# Patient Record
Sex: MALE | Race: WHITE
[De-identification: names, ages, dates, MRNs, and addresses within clinical notes are randomized per-mention and may not be internally consistent; named-entity substitution may affect disease eponyms.]

---

## 2018-05-12 ENCOUNTER — HOSPITAL ENCOUNTER (EMERGENCY)
Dept: HOSPITAL 74 - JER | Age: 28
Discharge: HOME | End: 2018-05-12
Payer: SELF-PAY

## 2018-05-12 VITALS — BODY MASS INDEX: 31.8 KG/M2

## 2018-05-12 VITALS — SYSTOLIC BLOOD PRESSURE: 139 MMHG | HEART RATE: 55 BPM | TEMPERATURE: 98 F | DIASTOLIC BLOOD PRESSURE: 74 MMHG

## 2018-05-12 DIAGNOSIS — J20.9: Primary | ICD-10-CM

## 2018-05-12 NOTE — PDOC
History of Present Illness





- General


Chief Complaint: Cold Symptoms


Stated Complaint: COLD SYMPTOMS


Time Seen by Provider: 05/12/18 22:14


History Source: Patient


Exam Limitations: No Limitations





- History of Present Illness


Initial Comments: 





05/12/18 23:11


Best Contact: [phone]


PCP:Dr. MELISA Garcia





Pmhx:N/A


Pshx:N/A


Allergies:NKDA





27-year-old male presents to the emergency department complaining of subjective 

fever, productive cough 4 days without headache, dizziness, lightheadedness, 

nausea/vomiting, chills, facial pain, nasal congestion, rhinorrhea, earaches, 

sore throat, neck pain/stiffness, back pains, chest pain, shortness of breath, 

abdominal pains, flank pains, urinary symptoms, extremity numbness or tingling 

sensation. Patient states he's been taking Mucinex which helps bring up his 

phlegm but doesn't subsided coughing.





Past History





- Past Medical History


Allergies/Adverse Reactions: 


 Allergies











Allergy/AdvReac Type Severity Reaction Status Date / Time


 


No Known Allergies Allergy   Verified 05/12/18 22:10











Home Medications: 


Ambulatory Orders





Oxycodone HCl/Acetaminophen [Percocet 5-325 mg Tablet -] 1 - 2 tab PO Q4H #10 

tablet 11/12/14 


Penicillin V Potassium [Pen Vee K -] 500 mg PO QID #40 tablet 11/12/14 











- Suicide/Smoking/Psychosocial Hx


Smoking Status: No


Smoking History: Never smoked


Have you smoked in the past 12 months: No


Number of Cigarettes Smoked Daily: 0


Information on smoking cessation initiated: No


Hx Alcohol Use: No


Drug/Substance Use Hx: No


Substance Use Type: None





**Review of Systems





- Review of Systems


Able to Perform ROS?: Yes


Comments:: 





05/12/18 23:13


CONSTITUTIONAL: 


Absent: fever, chills, diaphoresis, generalized weakness, malaise, loss of 

appetite


HEENT: 


Absent: rhinorrhea, nasal congestion, throat pain, throat swelling, difficulty 

swallowing, mouth swelling, ear pain, eye pain, visual Changes


CARDIOVASCULAR: 


Absent: chest pain, loss of consciousness, palpitations, irregular heart rate, 

peripheral edema


RESPIRATORY: 


+cough


Absent: shortness of breath, dyspnea with exertion, orthopnea, wheezing, stridor

, hemoptysis


GASTROINTESTINAL:


Absent: abdominal pain, abdominal distension, nausea, vomiting, diarrhea, 

constipation, melena, hematochezia


GENITOURINARY: 


Absent: dysuria, frequency, urgency, hesitancy, hematuria, flank pain, genital 

pain


MUSCULOSKELETAL: 


Absent: myalgia, arthralgia, joint swelling


SKIN: 


Absent: rash, itching, pallor


HEMATOLOGIC/IMMUNOLOGIC: 


Absent: easy bleeding, easy bruising, lymphadenopathy, frequent infections





Is the patient limited English proficient: No





*Physical Exam





- Vital Signs


 Last Vital Signs











Temp Pulse Resp BP Pulse Ox


 


 98.0 F   55 L  16   139/74   98 


 


 05/12/18 22:08  05/12/18 22:08  05/12/18 22:08  05/12/18 22:08  05/12/18 22:08














- Physical Exam


Comments: 





05/12/18 23:14


GENERAL:


Well developed, well nourished. Awake and alert. No acute distress.


HEENT:


Normocephalic, atraumatic. PERRLA, EOMI. No conjunctival pallor. Sclera are non-

icteric. Moist mucous membranes. Oropharynx is clear.


NECK: 


Supple. Full ROM. No JVD. Carotid pulses 2+ and symmetric, without bruits. No 

thyromegaly. No lymphadenopathy.


CARDIOVASCULAR:


Regular rate and rhythm. No murmurs, rubs, or gallops. Distal pulses are 2+ and 

symmetric. 


PULMONARY: 


No evidence of respiratory distress. Lungs clear to auscultation bilaterally. 

No wheezing, rales or rhonchi.


ABDOMINAL:


Soft. Non-tender. Non-distended. No rebound or guarding. No organomegaly. 

Normoactive bowel sounds. 


MUSCULOSKELETAL 


Normal range of motion at all joints. No bony deformities or tenderness. No CVA 

tenderness.


EXTREMITIES: 


No cyanosis. No clubbing. No edema. No calf tenderness.


SKIN: 


Warm and dry. Normal capillary refill. No rashes. No jaundice. 


NEUROLOGICAL: 


Alert, awake, appropriate. Cranial nerves 2-12 intact. No deficits to light 

touch and temperature in face, upper extremities and lower extremities. No 

motor deficits in the in face, upper extremities and lower extremities. 

Normoreflexic in the upper and lower extremities. Normal speech. Toes are down-

going bilaterally. Gait is normal without ataxia.


PSYCHIATRIC: 


Cooperative. Good eye contact. Appropriate mood and affect.





ED Treatment Course





- RADIOLOGY


Radiology Studies Ordered: 














 Category Date Time Status


 


 CHEST PA & LAT [RAD] Stat Radiology  05/12/18 22:57 Ordered











Radiograph Interpretation: 





05/12/18 23:15


CXR: 2v NAD





*DC/Admit/Observation/Transfer


Diagnosis at time of Disposition: 


Acute bronchitis


Qualifiers:


 Bronchitis organism: unspecified organism Qualified Code(s): J20.9 - Acute 

bronchitis, unspecified








- Discharge Dispostion


Condition at time of disposition: Stable


Decision to Admit order: No





- Referrals


Referrals: 


Shahriar Demarco MD, MD [Staff Physician] - 





- Patient Instructions


Printed Discharge Instructions:  DI for Acute Bronchitis


Additional Instructions: 


Rest


Increase fluids


Over-the-counter supportive care


Follow-up with your physician or the pulmonologist listed on your discharge 

within 48 hours


Please return back to the emergency department for severe/persistent or 

worsening symptoms





- Post Discharge Activity

## 2018-05-12 NOTE — PDOC
*Physical Exam





- Vital Signs


 Last Vital Signs











Temp Pulse Resp BP Pulse Ox


 


 98.0 F   55 L  16   139/74   98 


 


 05/12/18 22:08  05/12/18 22:08  05/12/18 22:08  05/12/18 22:08  05/12/18 22:08














Medical Decision Making





- Medical Decision Making





05/12/18 23:18


Pt seen by Midlevel Provider under my direct supervision


I agree with plan as outlined by Midlevel Provider








*DC/Admit/Observation/Transfer


Diagnosis at time of Disposition: 


Acute bronchitis


Qualifiers:


 Bronchitis organism: unspecified organism Qualified Code(s): J20.9 - Acute 

bronchitis, unspecified








- Discharge Dispostion


Disposition: HOME


Condition at time of disposition: Stable





- Prescriptions


Prescriptions: 


Azithromycin [Zithromax -] 250 mg PO UTDICT #6 tab





- Referrals


Referrals: 


Shahriar Demarco MD, MD [Staff Physician] - 





- Patient Instructions


Printed Discharge Instructions:  DI for Acute Bronchitis


Additional Instructions: 


Rest


Increase fluids


Over-the-counter supportive care


Follow-up with your physician or the pulmonologist listed on your discharge 

within 48 hours


Please return back to the emergency department for severe/persistent or 

worsening symptoms





- Post Discharge Activity

## 2018-09-26 ENCOUNTER — HOSPITAL ENCOUNTER (EMERGENCY)
Dept: HOSPITAL 74 - JER | Age: 28
Discharge: HOME | End: 2018-09-26
Payer: SELF-PAY

## 2018-09-26 VITALS — BODY MASS INDEX: 28.7 KG/M2

## 2018-09-26 VITALS — TEMPERATURE: 97.4 F

## 2018-09-26 VITALS — DIASTOLIC BLOOD PRESSURE: 85 MMHG | HEART RATE: 63 BPM | SYSTOLIC BLOOD PRESSURE: 135 MMHG
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
## 2018-09-26 DIAGNOSIS — S43.014A: Primary | ICD-10-CM
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
## 2018-09-26 PROCEDURE — 0RSJXZZ REPOSITION RIGHT SHOULDER JOINT, EXTERNAL APPROACH: ICD-10-PCS

## 2018-09-26 NOTE — PDOC
History of Present Illness





- General


Chief Complaint: Shoulder Dislocation


Stated Complaint: R SHOULDER DISLOCATION


Time Seen by Provider: 09/26/18 07:22


History Source: Patient


Exam Limitations: No Limitations





- History of Present Illness


Initial Comments: 





09/26/18 07:23





He is a 27-year-old male with a history of recurrent shoulder dislocation 

presents emergency department with right shoulder pain.


He woke this morning stating that his shoulder was in severe pain and is unable 

to move it.


Patient denies any traumatic injury.


Patient reports for prior shoulder dislocations on the right, and one prior 

shoulder dislocation on the left.


He denies numbness or tingling of the hand.


Patient states at the age of 21 he was told that he needed surgery for his 

shoulder, but then was told by the surgeon that he was too young to have a.








PMH: Denies collagen vascular disorders


PSH: Denies


Meds: Denies


ALL: NKDA


Social: denies drug use, works as a , denies heavy lifting


FH: non contributory








ROS:


GENERAL/CONSTITUTIONAL: No: fever, chills, tearful


HEAD, EYES, EARS, NOSE AND THROAT: No: change in vision  


CARDIOVASCULAR: No: chest pain  


RESPIRATORY: No: cough, shortness of breath 


GASTROINTESTINAL: No: nausea, vomiting, diarrhea, abdominal pain


GENITOURINARY: No:  flank pain.


MUSCULOSKELETAL: YES: RIGHT SHOULDER PAIN No: back pain, neck pain


SKIN: No: lesions, pallor, rash or easy bruising.


NEUROLOGIC: No: paresthesias, weakness 


ENDOCRINE: No: unexplained weight gain or loss


HEMATOLOGIC/LYMPHATIC: No: anemia, easy bleeding, swelling nodes.








PE:


GENERAL: The patient is in no acute distress, crying.


HEAD: Normal with no signs of trauma.


EYES: PERRLA, EOMI, sclera anicteric, conjunctiva clear.


ENT: Ears normal, nares patent, oropharynx clear without exudates.  Moist 

mucous membranes.


NECK: Normal range of motion, supple without midline tenderness


LUNGS: Breath sounds equal, clear to auscultation bilaterally.  


HEART:Regular rate and rhythm, normal S1 and S2 without murmur 


ABDOMEN: Soft, nontender, no guarding, no rebound.   


EXTREMITIES: Right shoulder deformity, pain with any movement, 


UNABLE to flex or extend, unable to abduct or adduct


Sensation in tact in the hand and overlying the deltoid


radial, medial, ulnar motor and sensation in tact in the hand


NEUROLOGICAL: Cranial nerves II through XII grossly intact.  Normal speech.  No 

focal neurological deficits. 


MUSCULOSKELETAL:  Back non-tender to palpation, see above


SKIN: Warm, Dry, normal turgor, no rashes or lesions noted.





Past History





- Past Medical History


Allergies/Adverse Reactions: 


 Allergies











Allergy/AdvReac Type Severity Reaction Status Date / Time


 


No Known Allergies Allergy   Verified 09/26/18 06:52











Home Medications: 


Ambulatory Orders





Cyclobenzaprine HCl 5 mg PO TID 3 Days #15 tablet 08/27/18 








COPD: No





- Suicide/Smoking/Psychosocial Hx


Smoking Status: No


Smoking History: Never smoked


Have you smoked in the past 12 months: No


Number of Cigarettes Smoked Daily: 0


Information on smoking cessation initiated: No


Hx Alcohol Use: No


Drug/Substance Use Hx: No


Substance Use Type: None





*Physical Exam





- Vital Signs


 Last Vital Signs











Temp Pulse Resp BP Pulse Ox


 


 97.4 F L  104 H  26 H  157/92   99 


 


 09/26/18 06:48  09/26/18 06:48  09/26/18 06:48  09/26/18 06:48  09/26/18 06:48














Procedures





- Joint Reduction


  ** Right


Joint Reduction Site: right: Shoulder


Pre-Procedure NV Exam: normal


Conscious Sedation: No


Reduction Attempts: 1


Procedure: Scapular Manipulation


Post-Procedure NV Exam: normal


Complications: No


Post Joint Reduction Film: joint reduced





ED Treatment Course





- Medications


Given in the ED: 


ED Medications














Discontinued Medications














Generic Name Dose Route Start Last Admin





  Trade Name Freq  PRN Reason Stop Dose Admin


 


Morphine Sulfate  4 mg  09/26/18 06:51  09/26/18 07:00





  Morphine Injection -  IVPUSH  09/26/18 06:52  4 mg





  ONCE ONE   Administration





     





     





     





     














Medical Decision Making





- Medical Decision Making





09/26/18 07:42


Right shoulder appears to be dislocated


Right shoulder reduction attempted after Morphine was given


It appears that the shoulder may be reduced as pt can touch the opposite arm


Will send for repeat xray


09/26/18 08:58





Post reduction film reveals appropriate shoulder reduction


2+ DP, 2+ PT


Sensation in tact overlying the Deltoid


R/M/U motor and sensation intact hand 


Will discharge to home


I have expressed to this patient that he MUST follow up with Ortho





Clinical impression: right shoulder dislocation, initial presentation








*DC/Admit/Observation/Transfer


Diagnosis at time of Disposition: 


Dislocation, shoulder, anterior


Qualifiers:


 Encounter type: initial encounter Laterality: right Qualified Code(s): 

S43.014A - Anterior dislocation of right humerus, initial encounter








- Discharge Dispostion


Disposition: HOME


Condition at time of disposition: Stable


Decision to Admit order: No





- Referrals


Referrals: 


Trae Watts MD [Staff Physician] - 


Shola Gabriel MD [Staff Physician] - 


Steve Hodges MD [Staff Physician] - 





- Patient Instructions


Printed Discharge Instructions:  DI for Shoulder Dislocation


Additional Instructions: 


Thank you for coming in to the ER today


YOU MUST FOLLOW UP WITH THE ORTHOPEDIC SPECIALIST


YOU MAY NEED ADDITIONAL IMAGING OF YOUR SHOULDER


When you injured your shoulder you may have damaged some tissues such as the 

capsule,


ligaments, tendons and muscles in your shoulder. The following are some 

instructions to prevent any further


damage and help these structures heal.


Activities


Do not lift and rotate your arm outwards. 


So not lift your arm above shoulder height in any direction.


Do not lift heavy objects with your affected arm in general. 


If this happens again, return to the ER for any other concerns or complaints





If possible wear your sling at all times 


However it can be removed when showering and getting dressed/undressed, sleeping


You should wear the sling for as long as the Orthopedist instructs you to do so


Take Motrin or Tylenol for pain


For the first 48-72 hours you can also use an ice pack (or a bag of frozen peas

) in a damp


towel on your shoulder to help with pain and swelling. An ice pack can be used 

for around 20 minutes every 1-2 hours





- Post Discharge Activity


Forms/Work/School Notes:  Back to Work

## 2019-03-07 ENCOUNTER — HOSPITAL ENCOUNTER (EMERGENCY)
Dept: HOSPITAL 74 - JERFT | Age: 29
Discharge: HOME | End: 2019-03-07
Payer: COMMERCIAL

## 2019-03-07 VITALS — HEART RATE: 68 BPM | DIASTOLIC BLOOD PRESSURE: 98 MMHG | SYSTOLIC BLOOD PRESSURE: 125 MMHG | TEMPERATURE: 98.5 F

## 2019-03-07 VITALS — BODY MASS INDEX: 33.7 KG/M2

## 2019-03-07 DIAGNOSIS — J06.9: Primary | ICD-10-CM

## 2019-03-07 NOTE — PDOC
History of Present Illness





- General


Chief Complaint: Respiratory


Stated Complaint: COUGHING


Time Seen by Provider: 03/07/19 10:17


History Source: Patient


Exam Limitations: Clinical Condition





- History of Present Illness


Initial Comments: 





03/07/19 10:33


Patient with no significant past medical history present with complaint of three

-day history of intermittent dry cough, nasal congestion, runny nose and throat 

irritation. Patient reported intermittent sore throat from coughing. Denies 

fever, chills, shortness of breath or chest pains. Denies any other symptoms


Timing/Duration: other (3 days)





Past History





- Past Medical History


Allergies/Adverse Reactions: 


 Allergies











Allergy/AdvReac Type Severity Reaction Status Date / Time


 


No Known Allergies Allergy   Verified 03/07/19 09:57











Home Medications: 


Ambulatory Orders





Cyclobenzaprine HCl 5 mg PO TID 3 Days #15 tablet 08/27/18 


Azithromycin [Zithromax Tri-Scott (3 DAYS) -] 500 mg PO DAILY #3 tablet 03/07/19 


Benzonatate [Tessalon Pearls -] 100 mg PO TID PRN #21 capsule 03/07/19 


Ipratropium Bromide 2 spray NS BID PRN #1 spray 03/07/19 








COPD: No





- Immunization History


Immunization Up to Date: Yes





- Suicide/Smoking/Psychosocial Hx


Smoking Status: No


Smoking History: Never smoked


Have you smoked in the past 12 months: No


Number of Cigarettes Smoked Daily: 0


Hx Alcohol Use: No


Drug/Substance Use Hx: No


Substance Use Type: None





**Review of Systems





- Review of Systems


Able to Perform ROS?: Yes


Is the patient limited English proficient: No


Constitutional: No: Chills, Fever, Malaise


HEENTM: Yes: Symptoms Reported, See HPI, Nose Congestion, Throat Pain.  No: Eye 

Pain, Blurred Vision, Tearing, Recent change in vision, Double Vision, Cataracts

, Ear Pain, Ocular Prothesis, Ear Discharge, Nose Pain, Tinnitus, Nose Bleeding

, Hearing Loss, Throat Swelling, Mouth Pain, Dental Problems, Difficulty 

Swallowing, Mouth Swelling, Other


Respiratory: Yes: Symptoms reported, See HPI, Cough.  No: Orthopnea, Shortness 

of Breath, SOB with Exertion, SOB at Rest, Stridor, Wheezing, Productive cough, 

Hemoptysis, Other


Cardiac (ROS): No: Symptoms Reported, See HPI, Chest Pain, Edema, Irregular 

Heart Rate, Lightheadedness, Palpitations, Syncope, Chest Tightness, Other


ABD/GI: No: Nausea, Vomiting


All Other Systems: Reviewed and Negative





*Physical Exam





- Vital Signs


 Last Vital Signs











Temp Pulse Resp BP Pulse Ox


 


 98.5 F   68   18   125/98   98 


 


 03/07/19 09:58  03/07/19 09:58  03/07/19 09:58  03/07/19 09:58  03/07/19 09:58














- Physical Exam


Comments: 





03/07/19 10:34


GENERAL:


Well developed, well nourished. Awake and alert. No acute distress.


HEENT:  Normocephalic, atraumatic. PERRLA, EOMI. No conjunctival pallor. Sclera 

are non-icteric. Moist mucous membranes. Oropharynx is clear.


NECK: 


Supple. Full ROM. 


CARDIOVASCULAR:


Regular rate and rhythm. No murmurs, rubs, or gallops. Distal pulses are 2+ and 

symmetric. 


PULMONARY: 


No evidence of respiratory distress. Lungs clear to auscultation bilaterally. 

No wheezing, rales or rhonchi.


ABDOMINAL:


Soft. Non-tender. Non-distended. No rebound or guarding. No organomegaly. 

Normoactive bowel sounds. 


MUSCULOSKELETAL 


Normal range of motion at all joints. 


SKIN: 


Warm and dry. Normal capillary refill. No rashes. No jaundice. 


NEUROLOGICAL: 


Alert, awake, appropriate.  Gait is normal without ataxia.


PSYCHIATRIC: 


Cooperative. Good eye contact. Appropriate mood 


General Appearance: Yes: Nourished, Appropriately Dressed.  No: Apparent 

Distress





Moderate Sedation





- Procedure Monitoring


Vital Signs: 


Procedure Monitoring Vital Signs











Temperature  98.5 F   03/07/19 09:58


 


Pulse Rate  68   03/07/19 09:58


 


Respiratory Rate  18   03/07/19 09:58


 


Blood Pressure  125/98   03/07/19 09:58


 


O2 Sat by Pulse Oximetry (%)  98   03/07/19 09:58











Medical Decision Making





- Medical Decision Making





03/07/19 10:35


Patient with no significant past medical history present with complaint of three

-day history of URI symptoms with throat irritation from coughing. Patient with 

no fever or chills.


Exam unremarkable lungs clear to auscultation bilateral and no pharyngeal 

erythema. Symptoms likely URI with throat pain from coughing. Patient is stable 

for outpatient management for URI with PCP follow-up.





*DC/Admit/Observation/Transfer


Diagnosis at time of Disposition: 


 Cough, Sore throat





URI (upper respiratory infection)


Qualifiers:


 URI type: unspecified URI Qualified Code(s): J06.9 - Acute upper respiratory 

infection, unspecified








- Discharge Dispostion


Disposition: HOME


Condition at time of disposition: Stable


Decision to Admit order: No





- Prescriptions


Prescriptions: 


Azithromycin [Zithromax Tri-Scott (3 DAYS) -] 500 mg PO DAILY #3 tablet


Benzonatate [Tessalon Pearls -] 100 mg PO TID PRN #21 capsule


 PRN Reason: Cough


Ipratropium Bromide 2 spray NS BID PRN #1 spray


 PRN Reason: nasal congestion





- Referrals


Referrals: 


Shahriar Orozco [Primary Care Provider] - 





- Patient Instructions


Printed Discharge Instructions:  Common Cold


Additional Instructions: 


Take medications as prescribed. Increase fluid intake. Follow-up with primary 

cast needed.





- Post Discharge Activity

## 2019-12-25 ENCOUNTER — HOSPITAL ENCOUNTER (EMERGENCY)
Dept: HOSPITAL 74 - FER | Age: 29
Discharge: HOME | End: 2019-12-25
Payer: COMMERCIAL

## 2019-12-25 VITALS — BODY MASS INDEX: 32.8 KG/M2

## 2019-12-25 VITALS — TEMPERATURE: 98.3 F | DIASTOLIC BLOOD PRESSURE: 76 MMHG | HEART RATE: 52 BPM | SYSTOLIC BLOOD PRESSURE: 116 MMHG

## 2019-12-25 DIAGNOSIS — J02.9: Primary | ICD-10-CM

## 2019-12-25 NOTE — PDOC
History of Present Illness





- General


Chief Complaint: Sore Throat


Stated Complaint: SORE THROAT


Time Seen by Provider: 12/25/19 11:39


History Source: Patient


Exam Limitations: No Limitations





- History of Present Illness


Initial Comments: 





12/25/19 11:43


28 y/o male with sore throat that started today and getting worse. No fever or 

chills. Has not taken anything for the pain. No N/V/d/C. No headache.





Past History





- Past Medical History


Allergies/Adverse Reactions: 


 Allergies











Allergy/AdvReac Type Severity Reaction Status Date / Time


 


No Known Allergies Allergy   Verified 12/25/19 11:38











Home Medications: 


Ambulatory Orders





NK [No Known Home Medication]  12/25/19 








COPD: No





- Immunization History


Immunization Up to Date: Yes





- Psycho Social/Smoking Cessation Hx


Smoking Status: No


Smoking History: Never smoked


Have you smoked in the past 12 months: No


Number of Cigarettes Smoked Daily: 0


Hx Alcohol Use: No


Drug/Substance Use Hx: No


Substance Use Type: None





**Review of Systems





- Review of Systems


Able to Perform ROS?: Yes


Is the patient limited English proficient: No


Constitutional: No: Chills, Fever


HEENTM: Yes: Throat Pain


Respiratory: No: Cough, Shortness of Breath


Cardiac (ROS): No: Chest Pain


ABD/GI: No: Nausea, Vomiting


All Other Systems: Reviewed and Negative





*Physical Exam





- Physical Exam


General Appearance: Yes: Nourished, Appropriately Dressed.  No: Apparent 

Distress


HEENT: positive: EOMI, JAMIE, Normal ENT Inspection (no redness, exudates or 

peritonsillar abscess noted), Normal Voice, Symmetrical, Pharynx Normal


Neck: positive: Trachea midline, Normal Thyroid, Supple.  negative: Tender, 

Rigid


Respiratory/Chest: positive: Lungs Clear, Normal Breath Sounds, Respiratory 

Distress.  negative: Chest Tender


Cardiovascular: positive: Regular Rhythm, Regular Rate, S1, S2


Vascular Pulses: Femoral (R): 4+, Femoral (L): 4+, Carotid (R): 4+, Carotid (L)

: 4+, Dorsalis-Pedis (R): 4+, Doralis-Pedis (L): 4+


Gastrointestinal/Abdominal: positive: Normal Bowel Sounds, Flat, Soft.  negative

: Tender, Organomegaly


Lymphatic: positive: Adenopathy (b/l cervical lymphadenopathy).  negative: 

Tenderness


Musculoskeletal: positive: Normal Inspection.  negative: CVA Tenderness


Extremity: positive: Normal Capillary Refill, Normal Inspection, Normal Range 

of Motion.  negative: Tender


Integumentary: positive: Normal Color, Dry, Warm


Neurologic: positive: CNs II-XII NML intact, Fully Oriented, Alert, Normal Mood/

Affect, Normal Response, Motor Strength 5/5





ED Treatment Course





- ADDITIONAL ORDERS


Additional order review: 





12/25/19 11:46


Sore throat for 1 day, will obtain strep test





12/25/19 12:15


Strep test negative





Dx viral pharyngitis


Fluids, rest, Tylenol


If worsen return to ER


Pt is in agreement with plan





Discharge





- Discharge Information


Problems reviewed: Yes


Clinical Impression/Diagnosis: 


Pharyngitis


Qualifiers:


 Pharyngitis/tonsillitis etiology: unspecified etiology Qualified Code(s): 

J02.9 - Acute pharyngitis, unspecified





Condition: Good


Disposition: HOME





- Admission


No





- Follow up/Referral


Referrals: 


Shahriar Orozco [Primary Care Provider] - 





- Patient Discharge Instructions


Patient Printed Discharge Instructions:  DI for Viral Pharyngitis


Additional Instructions: 


Fluids, rest, Motrin


If worsen return  to ER





- Post Discharge Activity

## 2020-02-17 ENCOUNTER — HOSPITAL ENCOUNTER (EMERGENCY)
Dept: HOSPITAL 74 - FER | Age: 30
Discharge: HOME | End: 2020-02-17
Payer: COMMERCIAL

## 2020-02-17 VITALS — TEMPERATURE: 99.1 F | SYSTOLIC BLOOD PRESSURE: 136 MMHG | DIASTOLIC BLOOD PRESSURE: 69 MMHG | HEART RATE: 108 BPM

## 2020-02-17 VITALS — BODY MASS INDEX: 33.1 KG/M2

## 2020-02-17 DIAGNOSIS — J06.9: ICD-10-CM

## 2020-02-17 DIAGNOSIS — J09.X2: Primary | ICD-10-CM

## 2020-02-17 NOTE — PDOC
History of Present Illness





- General


Chief Complaint: Respiratory


Stated Complaint: COUGH X 2 DAYS,FEVER


Time Seen by Provider: 02/17/20 23:19


History Source: Patient


Exam Limitations: No Limitations





- History of Present Illness


Initial Comments: 





02/17/20 23:20


HPI


29 YOM with PMH chronic cervical neck disease and chronic shoulder pain/

dislocations presenting with clear cough x 2 days, today with fever (Tmax 102) 

and chills/rigors. He endorse chest tightness worse with the coughing episodes, 

+clear nasal congestion, left ear pain, sweats, headache and dizziness. Also 

associated with generalized malaise. Today had 3 episodes of nonbloody watery 

brown stools/diarrhea and nausea. He has been cycling between tylenol and 

motrin (last dose of tylenol about 2 hours PTA) with some improvement. he has 

also tried nyquil and mucinex with minimal improvement.


pt baseline has right sided shoulder and neck pain with chronic neck pain/

degenerative disc disease, with prior recurrent shoulder dislocations.


+sick contacts several weeks ago where family members had URI symptoms, who 

have all improved.





Denies neck stiffness, myalgias, body aches, arthralgias, syncope, palpitation, 

weakness, vomiting, abdominal pain, bladder and bowel problems, focal weakness/

paresthesias, leg swelling/pain, rash. No travel. No new changes in 

medications. No suspicious food intake


Allergies: None


Past Medical History/PSH: as above


Social history: Lives with family. No tobacco, ETOH or drug use.


Meds: none


Family history: noncontributory








Review of systems


Constitutional: +fever, chills, generalized weakness and sweats.


HEENT: +headache or dizziness. +nasal congestion. +left ear pain. No visual/

hearing disturbances. no eye pain or discharge.


CVS:  no syncope. +chest pain


Resp: +cough, SOB


Gastrointestinal: no abdominal pain, No vomiting. +diarrhea and nausea.


Genitourinary: no urinary sx, hematuria.


MUSCULOSKELETAL: No joint pain and swelling. No neck stiffness, or back pain.


SKIN: no redness or skin changes, no discharge, no rash. No wounds.


Hematologic: no easy bruising/bleeding. 


NEUROLOGIC: No  LOC or altered mental status. No weakness, numbness or 

tingling. 


Psych: no anxiety or depression


Allergic/Immunologic: no allergies


All other systems reviewed and negative, or as documented in HPI. 





Physical exam 


General:   Well appearing, awake and alert, NAD. 


HEENT:  NCAT, PERRL, EOMI, clear conjunctiva, anicteric, moist mucus membranes, 

clear oropharynx, no oral lesions.. uvula midline. normal phonation, TM clear 

bilaterally, no sinus tenderness to percussion.


Neck:  neck supple, FROM, no meningeal signs, neg brudzinki's or kernig's sign.


Resp:  CTAB, normal and even respirations, no respiratory distress


CVS: +tachycardia, no murmurs, 2+ peripheral pulses throughout, no peripheral 

edema


Abdomen: soft, NTND, no rebound or guarding. 


Back: nontender, normal inspection and ROM


MSK:  no edema, WALL x4, ROM intact. No clubbing or cyanosis. normal bulk and 

tone.


Extremities: no calf tenderness


Neuro: alert, oriented appropriately; no focal neurologic deficits


Psych: Calm and cooperative


Skin: warm and well perfused, cap refill <2 sec, normal color, no rash or skin 

discoloration.








02/17/20 23:41





02/17/20 23:44








Past History





- Past Medical History


Allergies/Adverse Reactions: 


 Allergies











Allergy/AdvReac Type Severity Reaction Status Date / Time


 


No Known Allergies Allergy   Verified 02/17/20 23:16











Home Medications: 


Ambulatory Orders





Benzonatate [Tessalon Pearls -] 100 mg PO TID PRN #15 capsule 02/17/20 


Oseltamivir Phosphate [Tamiflu -] 75 mg PO BID #10 capsule 02/18/20 








COPD: No





- Immunization History


Immunization Up to Date: Yes





- Psycho Social/Smoking Cessation Hx


Smoking Status: No


Smoking History: Never smoked


Have you smoked in the past 12 months: No


Number of Cigarettes Smoked Daily: 0


Information on smoking cessation initiated: No


'Breaking Loose' booklet given: 12/25/19


Hx Alcohol Use: No


Drug/Substance Use Hx: No


Substance Use Type: None





*Physical Exam





- Vital Signs


 Last Vital Signs











Temp Pulse Resp BP Pulse Ox


 


 99.1 F   108 H  16   136/69   99 


 


 02/17/20 23:16  02/17/20 23:16  02/17/20 23:16  02/17/20 23:16  02/17/20 23:16














Medical Decision Making





- Medical Decision Making





02/17/20 23:48


Vital Signs











Temp Pulse Resp BP Pulse Ox


 


 99.1 F   108 H  16   136/69   99 


 


 02/17/20 23:16  02/17/20 23:16  02/17/20 23:16  02/17/20 23:16  02/17/20 23:16





Vital signs reviewed here no fever, patient did take Tylenol several hours 

prior to presentation so likely has defervesced.  Mild tachycardia 108 but is 

nontoxic and in no acute distress.  Hemodynamically appropriate, saturations 99

% on room air, no respiratory distress


Differential diagnosis includes viral syndrome, influenza, dehydration, 

bronchitis, pneumonia.


Lungs are clear to auscultation, no focal findings elicited so unlikely to be 

pneumonia.  Again patient does not have a fever while here and is nonseptic 

appearing


Given his timeframe of symptoms will obtain flu swab, risks and benefits of flu 

testing and treatment was discussed with the patient and he prefers to check 

for the flu and would like testing.


no oropharyngeal findings, normal HEENT exam as documented, no strep testing 

indicated


Given ibuprofen, Rx Tessalon Perles for cough.


Supportive care measures were advised as this is most likely upper respiratory 

infection secondary to viral illness, no evidence to utilize antibiotics are 

indicated this time is doubt this is bacterial.





Patient was given instructions including salt water gargles, lemon, tea, ginger 

and honey to soothe the throat as well as a cough


Pt to be discharged in stable condition. Patient and family made aware of 

clinical impression, treatment recommendations and disposition plan, return 

precautions discussed (including but not limited to new or persistent/worsening 

symptoms, pain, fevers, or signs of infection, chest pain, respiratory distress

, inability to tolerate oral intake, dehydration, syncope, or neurologic changes

). Follow up with PMD and/or specialist as recommended, follow up information 

provided, take medications as instructed for duration of time. continue with 

supportive care, avoid triggers and precipitants. All questions answered to 

patient's satisfaction and expressed understanding and comfort with this. At 

the time of discharge, the patient is alert, clinically improved, tolerating po 

and verbalizes understanding of instructions, satisfied with the care received 

and felt comfortable with the plan. Patient does not suffer from an acute life-

threatening medical condition at this time and  is safe for outpatient follow-

up. 





02/18/20 01:55


- influenza test came back positive for flu A


called the patient and discussed results, contact precautions, mask, hydration 

and hand washing. avoid contacts with immunocompromised individuals, elderly, 

very young children, for risk of spreading infection and potential complications


minimize contacts/exposure  until >24 hours of being fever free


tamiflu sent to pharmacy, BID x 5 days. pt elects for tamiflu, which can reduce 

course <20 hours, but no necessarily the symptoms.











Discharge





- Discharge Information


Problems reviewed: Yes


Clinical Impression/Diagnosis: 


 Influenza A





URI (upper respiratory infection)


Qualifiers:


 URI type: unspecified URI Qualified Code(s): J06.9 - Acute upper respiratory 

infection, unspecified





Condition: Stable


Disposition: HOME





- Admission


No





- Additional Discharge Information


Prescriptions: 


Benzonatate [Tessalon Pearls -] 100 mg PO TID PRN #15 capsule


 PRN Reason: Cough


Oseltamivir Phosphate [Tamiflu -] 75 mg PO BID #10 capsule





- Follow up/Referral


Referrals: 


[facility] [Provider Group]


[facility] [address] [Provider Group]





- Patient Discharge Instructions


Patient Printed Discharge Instructions:  Common Cold, DI for Cough -- Adult, DI 

for Viral Upper Respiratory Infection -- Adult


Additional Instructions: 


RESPIRATORY infection recommendations:





these are more natural supplementation that can help you with your symptoms:





salt water gargles, 1-2 spoonful of honey and warm lemon tea is appropriate as 

well for soothing qualities for sore throat/cough.


minimize spread of infection given contagious nature, and cover your mouth and 

wash your hands adequately with soap and water.


Stay well hydrated and rest. Cool air - walk around outdoors in the evening. 

May also try hot shower steam. This can alleviate the congestion and cough.


You can also use Riccola - dual action with lemon/honey to soothe your throat 

and cough.





You can also take tessalon perles three times a day as needed for the cough if 

the above do not work 


You can take afrin sprays twice a day (not more than 3 days) for nasal 

congestion OR sudafed via the pharmacy for the nasal congestion (behind the 

pharmacy counter for the higher dosing).





follow up on the flu swab we obtained here


Return precautions include respiratory distress, difficulty breathing, cyanosis

, chest pain, lethargy, confusion, dehydration, high fevers or pain.








- Post Discharge Activity

## 2021-11-14 ENCOUNTER — HOSPITAL ENCOUNTER (EMERGENCY)
Dept: HOSPITAL 74 - FER | Age: 31
Discharge: HOME | End: 2021-11-14
Payer: COMMERCIAL

## 2021-11-14 VITALS — BODY MASS INDEX: 32.8 KG/M2

## 2021-11-14 VITALS — DIASTOLIC BLOOD PRESSURE: 77 MMHG | TEMPERATURE: 98.9 F | SYSTOLIC BLOOD PRESSURE: 144 MMHG | HEART RATE: 77 BPM

## 2021-11-14 DIAGNOSIS — J06.9: Primary | ICD-10-CM
